# Patient Record
Sex: MALE | Race: WHITE
[De-identification: names, ages, dates, MRNs, and addresses within clinical notes are randomized per-mention and may not be internally consistent; named-entity substitution may affect disease eponyms.]

---

## 2020-12-31 ENCOUNTER — HOSPITAL ENCOUNTER (EMERGENCY)
Dept: HOSPITAL 62 - ER | Age: 30
Discharge: HOME | End: 2020-12-31
Payer: COMMERCIAL

## 2020-12-31 VITALS — SYSTOLIC BLOOD PRESSURE: 148 MMHG | DIASTOLIC BLOOD PRESSURE: 74 MMHG

## 2020-12-31 DIAGNOSIS — R10.33: ICD-10-CM

## 2020-12-31 DIAGNOSIS — R19.7: ICD-10-CM

## 2020-12-31 DIAGNOSIS — F41.9: ICD-10-CM

## 2020-12-31 DIAGNOSIS — F17.200: ICD-10-CM

## 2020-12-31 DIAGNOSIS — R61: ICD-10-CM

## 2020-12-31 DIAGNOSIS — R11.2: Primary | ICD-10-CM

## 2020-12-31 DIAGNOSIS — F12.10: ICD-10-CM

## 2020-12-31 LAB
ADD MANUAL DIFF: NO
ALBUMIN SERPL-MCNC: 5.7 G/DL (ref 3.5–5)
ALP SERPL-CCNC: 83 U/L (ref 38–126)
ANION GAP SERPL CALC-SCNC: 16 MMOL/L (ref 5–19)
APPEARANCE UR: CLEAR
APTT PPP: (no result) S
AST SERPL-CCNC: 66 U/L (ref 17–59)
BASOPHILS # BLD AUTO: 0.1 10^3/UL (ref 0–0.2)
BASOPHILS NFR BLD AUTO: 1.3 % (ref 0–2)
BILIRUB DIRECT SERPL-MCNC: 0.5 MG/DL (ref 0–0.4)
BILIRUB SERPL-MCNC: 1.7 MG/DL (ref 0.2–1.3)
BILIRUB UR QL STRIP: NEGATIVE
BUN SERPL-MCNC: 10 MG/DL (ref 7–20)
CALCIUM: 11 MG/DL (ref 8.4–10.2)
CHLORIDE SERPL-SCNC: 91 MMOL/L (ref 98–107)
CO2 SERPL-SCNC: 30 MMOL/L (ref 22–30)
EOSINOPHIL # BLD AUTO: 0.1 10^3/UL (ref 0–0.6)
EOSINOPHIL NFR BLD AUTO: 0.8 % (ref 0–6)
ERYTHROCYTE [DISTWIDTH] IN BLOOD BY AUTOMATED COUNT: 12.1 % (ref 11.5–14)
GLUCOSE SERPL-MCNC: 105 MG/DL (ref 75–110)
GLUCOSE UR STRIP-MCNC: NEGATIVE MG/DL
HCT VFR BLD CALC: 47.1 % (ref 37.9–51)
HGB BLD-MCNC: 16.9 G/DL (ref 13.5–17)
KETONES UR STRIP-MCNC: 100 MG/DL
LYMPHOCYTES # BLD AUTO: 1.7 10^3/UL (ref 0.5–4.7)
LYMPHOCYTES NFR BLD AUTO: 21.1 % (ref 13–45)
MCH RBC QN AUTO: 34.1 PG (ref 27–33.4)
MCHC RBC AUTO-ENTMCNC: 35.9 G/DL (ref 32–36)
MCV RBC AUTO: 95 FL (ref 80–97)
MONOCYTES # BLD AUTO: 1 10^3/UL (ref 0.1–1.4)
MONOCYTES NFR BLD AUTO: 12.7 % (ref 3–13)
NEUTROPHILS # BLD AUTO: 5 10^3/UL (ref 1.7–8.2)
NEUTS SEG NFR BLD AUTO: 64.1 % (ref 42–78)
NITRITE UR QL STRIP: NEGATIVE
PH UR STRIP: 5 [PH] (ref 5–9)
PLATELET # BLD: 307 10^3/UL (ref 150–450)
POTASSIUM SERPL-SCNC: 4 MMOL/L (ref 3.6–5)
PROT SERPL-MCNC: 10 G/DL (ref 6.3–8.2)
PROT UR STRIP-MCNC: 100 MG/DL
RBC # BLD AUTO: 4.97 10^6/UL (ref 4.35–5.55)
SP GR UR STRIP: 1.02
TOTAL CELLS COUNTED % (AUTO): 100 %
UROBILINOGEN UR-MCNC: 2 MG/DL (ref ?–2)
WBC # BLD AUTO: 7.8 10^3/UL (ref 4–10.5)

## 2020-12-31 PROCEDURE — 85025 COMPLETE CBC W/AUTO DIFF WBC: CPT

## 2020-12-31 PROCEDURE — 83735 ASSAY OF MAGNESIUM: CPT

## 2020-12-31 PROCEDURE — 99284 EMERGENCY DEPT VISIT MOD MDM: CPT

## 2020-12-31 PROCEDURE — 83690 ASSAY OF LIPASE: CPT

## 2020-12-31 PROCEDURE — 81001 URINALYSIS AUTO W/SCOPE: CPT

## 2020-12-31 PROCEDURE — 80053 COMPREHEN METABOLIC PANEL: CPT

## 2020-12-31 PROCEDURE — 36415 COLL VENOUS BLD VENIPUNCTURE: CPT

## 2020-12-31 PROCEDURE — 96374 THER/PROPH/DIAG INJ IV PUSH: CPT

## 2020-12-31 PROCEDURE — 96361 HYDRATE IV INFUSION ADD-ON: CPT

## 2020-12-31 NOTE — ER DOCUMENT REPORT
ED General





- General


Chief Complaint: Abdominal Pain


Stated Complaint: ABDOMINAL PAIN,WEAKNESS


Time Seen by Provider: 12/31/20 10:19





- HPI


Notes: 





Patient presents emergency department for evaluation of nausea, vomiting, 

diarrhea after a drinking binge.  He was drinking alcohol heavily for 3 days.  

On Monday or Tuesday morning he woke with multiple episodes of emesis.  He 

states that some of it was bright red, he was concerned it might be blood, but 

he had just been drinking sangria.  He did have some diarrhea, and some stools 

that were darker, but he denies any ortiz melena.  He had a loose bowel movement

yesterday but states it was not dark.  He had some abdominal pain that was lower

and cramping, but it seems to have resolved as well.





- Related Data


Allergies/Adverse Reactions: 


                                        





No Known Allergies Allergy (Unverified 12/31/20 14:15)


   











Past Medical History





- General


Information source: Patient





- Social History


Smoking Status: Current Every Day Smoker


Frequency of alcohol use: Heavy


Drug Abuse: Marijuana


Family History: Reviewed & Not Pertinent





Review of Systems





- Review of Systems


Constitutional: No symptoms reported, Diaphoresis


EENT: No symptoms reported


Cardiovascular: No symptoms reported


Respiratory: No symptoms reported


Gastrointestinal: See HPI


Genitourinary: No symptoms reported


Musculoskeletal: No symptoms reported


Skin: No symptoms reported


Neurological/Psychological: See HPI





Physical Exam





- Vital signs


Vitals: 


                                        











Temp Pulse Resp BP Pulse Ox


 


 98.2 F   82   16   136/81 H  100 


 


 12/31/20 10:06  12/31/20 10:06  12/31/20 10:06  12/31/20 10:06  12/31/20 10:06














- Notes


Notes: 





Vital signs reviewed, please refer to chart. Head is normocephalic, atraumatic. 

Pupils equal round, reactive to light.  Neck is supple without meningismus.  

Heart is regular rate and rhythm.  Lungs are clear to auscultation bilaterally. 

Abdomen is soft, nontender, normoactive bowel sounds throughout.  Extremities 

without cyanosis, clubbing. Posterior calves are nontender.  Peripheral pulses 

are equal.  Skin is warm and dry.  Patient is awake, alert, neurological exam is

nonfocal.





Course





- Re-evaluation


Re-evalutation: 





12/31/20 16:20


Patient presents emergency department for evaluation.  He was initially seen 

through triage.  The patient had admitted to a heavy binge on alcohol preceding 

all of his symptoms.  On further questioning he states he has been drinking 

daily for the last several weeks.  We talked at length about this, he states 

that he is interested in cutting down.  He denies needing any acute help with 

this.  He has never had any seizures or other significant withdrawal symptoms.  

Otherwise, I suspect the increased anxiety and diaphoresis he is having is 

secondary to the alcohol.  His diarrhea and abdominal pain have entirely 

resolved prior to arrival.  His nausea is feeling improved, he is feeling 

improved overall after the fluids.  I will send him in with nausea.  Is 

encouraged to cut down on his drinking, follow-up with primary care, return to 

the ED with worsening.





- Vital Signs


Vital signs: 


                                        











Temp Pulse Resp BP Pulse Ox


 


 98.2 F   71   16   148/74 H  97 


 


 12/31/20 10:06  12/31/20 17:23  12/31/20 17:23  12/31/20 17:23  12/31/20 17:23














- Laboratory Results


Result Diagrams: 


                                 12/31/20 10:35





                                 12/31/20 10:35


Laboratory Results Interpreted: 


                                        











  12/31/20 12/31/20 12/31/20





  10:30 10:35 10:35


 


MCH   34.1 H 


 


Chloride    91 L


 


Calcium    11.0 H


 


Total Bilirubin    1.7 H


 


Direct Bilirubin    0.5 H


 


AST    66 H


 


Total Protein    10.0 H


 


Albumin    5.7 H


 


Urine Protein  100 H  


 


Urine Ketones  100 H  


 


Urine Blood  SMALL H  


 


Urine Urobilinogen  2.0 H  











Critical Laboratory Results Reviewed: No Critical Results





- Radiology Results


Critical Radiology Results Reviewed: No Critical Results





Discharge





- Discharge


Clinical Impression: 


 Alcohol consumption binge drinking





Nausea & vomiting


Qualifiers:


 Vomiting type: unspecified Vomiting Intractability: non-intractable Qualified 

Code(s): R11.2 - Nausea with vomiting, unspecified





Diarrhea


Qualifiers:


 Diarrhea type: unspecified type Qualified Code(s): R19.7 - Diarrhea, 

unspecified





Condition: Stable


Disposition: HOME, SELF-CARE


Instructions:  Antinausea Medication ([facility]), Diarrhea, Nonspecific ([facility]), 

Intravenous (IV) Fluids ([facility]), Vomiting ([facility])


Additional Instructions: 


Please abstain from binge drinking as discussed.  Stay hydrated.  Zofran as 

needed for nausea.  Follow-up with primary care next week.  Return to the 

emergency department with worsening or new concerning symptoms of any sort.


Prescriptions: 


Ondansetron [Zofran Odt 4 mg Tablet] 1 - 2 tab PO Q4H PRN #15 tab.rapdis


 PRN Reason: For Nausea/Vomiting

## 2020-12-31 NOTE — ER DOCUMENT REPORT
ED Medical Screen (RME)





- General


Chief Complaint: Abdominal Pain


Stated Complaint: ABDOMINAL PAIN,WEAKNESS


Time Seen by Provider: 12/31/20 10:19


Notes: 





Patient presents complaining of abdominal pain nausea vomiting and diarrhea over

the past 2 days.  Patient states that leading up he had been on an alcohol 

puckett for 2 days prior to onset of his symptoms.  Patient states he has never 

had a hangover that lasted this long.  Patient reports having sweats 

occasionally.  Patient last vomited yesterday although has had continued loose 

stools.  Patient had a friend who was a medic come over and given 2 L IV fluid 

yesterday.  Patient denies any significant medical history or previous 

surgeries.





I have greeted and performed a rapid initial assessment of this patient.  A 

comprehensive ED assessment and evaluation of the patient, analysis of test 

results and completion of the medical decision making process will be conducted 

by additional ED providers.





Physical Exam





- Vital signs


Vitals: 





                                        











Temp Pulse Resp BP Pulse Ox


 


 98.2 F   82   16   136/81 H  100 


 


 12/31/20 10:06  12/31/20 10:06  12/31/20 10:06  12/31/20 10:06  12/31/20 10:06














- Abdominal


Inspection: Normal


Tenderness: Tender - Generalized periumbilical area, exam limited as patient is 

in chair





Course





- Vital Signs


Vital signs: 





                                        











Temp Pulse Resp BP Pulse Ox


 


 98.2 F   82   16   136/81 H  100 


 


 12/31/20 10:06  12/31/20 10:06  12/31/20 10:06  12/31/20 10:06  12/31/20 10:06